# Patient Record
Sex: MALE | Race: WHITE | NOT HISPANIC OR LATINO | Employment: FULL TIME | URBAN - METROPOLITAN AREA
[De-identification: names, ages, dates, MRNs, and addresses within clinical notes are randomized per-mention and may not be internally consistent; named-entity substitution may affect disease eponyms.]

---

## 2018-05-22 ENCOUNTER — OFFICE VISIT (OUTPATIENT)
Dept: URGENT CARE | Facility: CLINIC | Age: 23
End: 2018-05-22
Payer: COMMERCIAL

## 2018-05-22 VITALS
BODY MASS INDEX: 18.66 KG/M2 | SYSTOLIC BLOOD PRESSURE: 124 MMHG | HEIGHT: 69 IN | RESPIRATION RATE: 17 BRPM | HEART RATE: 107 BPM | OXYGEN SATURATION: 97 % | WEIGHT: 126 LBS | DIASTOLIC BLOOD PRESSURE: 76 MMHG | TEMPERATURE: 99 F

## 2018-05-22 DIAGNOSIS — S01.01XA SCALP LACERATION, INITIAL ENCOUNTER: Primary | ICD-10-CM

## 2018-05-22 PROCEDURE — 90715 TDAP VACCINE 7 YRS/> IM: CPT | Mod: AT,S$GLB,, | Performed by: FAMILY MEDICINE

## 2018-05-22 PROCEDURE — 90471 IMMUNIZATION ADMIN: CPT | Mod: AT,S$GLB,, | Performed by: FAMILY MEDICINE

## 2018-05-22 PROCEDURE — 99999 PR PBB SHADOW E&M-NEW PATIENT-LVL III: CPT | Mod: PBBFAC,,, | Performed by: NURSE PRACTITIONER

## 2018-05-22 PROCEDURE — 3008F BODY MASS INDEX DOCD: CPT | Mod: CPTII,S$GLB,, | Performed by: NURSE PRACTITIONER

## 2018-05-22 PROCEDURE — 99214 OFFICE O/P EST MOD 30 MIN: CPT | Mod: 25,S$GLB,, | Performed by: NURSE PRACTITIONER

## 2018-05-22 RX ORDER — MUPIROCIN 20 MG/G
OINTMENT TOPICAL 3 TIMES DAILY
Qty: 22 G | Refills: 0 | Status: SHIPPED | OUTPATIENT
Start: 2018-05-22

## 2018-05-22 NOTE — PATIENT INSTRUCTIONS
Infected Laceration, Not Sutured  A laceration is a cut through the skin. The cut has become infected. Because of the infection, and the amount of time that has passed since injury, the wound cannot be closed. It will heal best if left open and cleaned daily. It will seal over by growing new tissue from the sides and the bottom of the wound. Antibiotics may be prescribed. A scar will probably remain after it has healed.   Oral antibiotic medicine may be prescribed to treat the infection.  Home care  · If antibiotics have been prescribed, take them exactly as directed. Do not stop taking them until they are gone or you are told to stop, even if you feel better.   · Follow the health care providers instructions on how to care for the cut.  · Unless otherwise instructed, change the bandage twice a day for the first few days, until the drainage stops. Then change it once a day. Change the bandage if it becomes wet, stained with wound fluid, or dirty.  · Clean the wound daily:  · After removing the bandage, gently wash the area with soap and water. Use a wet cotton swab to loosen and remove any blood or crust that forms.  · After cleaning, apply a thin layer of over-the-counter antibiotic ointment if advised. Reapply a fresh bandage.  · Follow the healthcare provider's instructions for keeping the wound dry. You may be given restrictions on showering or tub baths.  · If the bandage gets wet, remove it. Gently pat the wound dry with a clean cloth, then replace the wet bandage with a dry one.  · Do not scratch, rub, or pick at the area.  · Wash your hands with soap and warm water before and after cleaning the wound or changing the bandage.  Follow-up care  Follow up with your healthcare provider as advised. It is important to follow up to ensure that the infection is improving.  When to seek medical advice  Call your healthcare provider right away if any of these occur:  · Symptoms don't begin to improve or get  worse  · Red streaks coming from the wound  · Increase in pus coming from the wound  · Fever of 100.4°F (38°C) or higher, or as directed by your healthcare provider  Date Last Reviewed: 6/14/2015  © 1035-8702 [employer]. [address] [place] [id number]. All rights reserved. This information is not intended as a substitute for professional medical care. Always follow your healthcare professional's instructions.

## 2018-05-22 NOTE — PROGRESS NOTES
Subjective:       Patient ID: Jw Louis is a 23 y.o. male.    Chief Complaint: Head Injury    23 year old male presents to Urgent Care with reports of hitting his head on a nail while in an attic working. Denies any dizziness or headaches. Last tetanus greater than 10 years.       Head Injury    The incident occurred 6 to 12 hours ago. There was no loss of consciousness. The quality of the pain is described as aching. The pain is at a severity of 0/10. The patient is experiencing no pain. Pertinent negatives include no blurred vision, memory loss, numbness, vomiting or weakness. He has tried nothing for the symptoms.     Review of Systems   Constitutional: Negative for appetite change, chills and fever.   HENT: Negative for ear pain, sinus pressure, sore throat and trouble swallowing.    Eyes: Negative for blurred vision and visual disturbance.   Respiratory: Negative for shortness of breath.    Cardiovascular: Negative for chest pain.   Gastrointestinal: Negative for abdominal pain, diarrhea, nausea and vomiting.   Endocrine: Negative for cold intolerance, polyphagia and polyuria.   Genitourinary: Negative for decreased urine volume and dysuria.   Musculoskeletal: Negative for back pain.   Skin: Negative for rash.   Allergic/Immunologic: Negative for environmental allergies and food allergies.   Neurological: Negative for dizziness, tremors, weakness and numbness.   Hematological: Does not bruise/bleed easily.   Psychiatric/Behavioral: Negative for confusion, hallucinations and memory loss. The patient is not nervous/anxious and is not hyperactive.    All other systems reviewed and are negative.      Objective:     Physical Exam   Constitutional: He is oriented to person, place, and time. He appears well-developed and well-nourished.   HENT:   Head: Normocephalic.       Right Ear: External ear normal.   Left Ear: External ear normal.   Nose: Nose normal.   Mouth/Throat: Oropharynx is clear and moist.    Eyes: Conjunctivae and EOM are normal. Pupils are equal, round, and reactive to light.   Neck: Normal range of motion. Neck supple. No JVD present.   Cardiovascular: Normal rate, regular rhythm, normal heart sounds and intact distal pulses.    Pulmonary/Chest: Effort normal and breath sounds normal. He has no wheezes.   Abdominal: Soft. Bowel sounds are normal. There is no tenderness.   Lymphadenopathy:     He has no cervical adenopathy.   Neurological: He is alert and oriented to person, place, and time. He has normal strength. He displays a negative Romberg sign. Coordination and gait normal. GCS eye subscore is 4. GCS verbal subscore is 5. GCS motor subscore is 6.   Reflex Scores:       Tricep reflexes are 2+ on the right side and 2+ on the left side.       Bicep reflexes are 2+ on the right side and 2+ on the left side.       Brachioradialis reflexes are 2+ on the right side and 2+ on the left side.       Patellar reflexes are 2+ on the right side and 2+ on the left side.       Achilles reflexes are 2+ on the right side and 2+ on the left side.  Skin: Skin is warm and dry.   Psychiatric: He has a normal mood and affect. His behavior is normal. Judgment and thought content normal.   Nursing note and vitals reviewed.    Assessment:     1. Scalp laceration, initial encounter      Plan:   Jw Sanford was seen today for head injury.    Diagnoses and all orders for this visit:    Scalp laceration, initial encounter    Other orders  -     Tdap Vaccine  -     mupirocin (BACTROBAN) 2 % ointment; Apply topically 3 (three) times daily.    Instructed patient to follow prescribed treatment plan as directed and recommended follow up with PCP within 1 week or sooner if needed.

## 2018-05-22 NOTE — PROGRESS NOTES
Pt given TDAP vaccine to right deltoid. Pt tolerated injection well. Advised pt to stay 15 minutes after injection. Pt verbalized understanding.

## 2024-01-30 ENCOUNTER — IMMUNIZATION (OUTPATIENT)
Dept: PEDIATRICS | Facility: CLINIC | Age: 29
End: 2024-01-30
Payer: COMMERCIAL

## 2024-01-30 VITALS — TEMPERATURE: 98 F

## 2024-01-30 PROCEDURE — 90686 IIV4 VACC NO PRSV 0.5 ML IM: CPT | Mod: S$GLB,,, | Performed by: PEDIATRICS

## 2024-01-30 PROCEDURE — 90471 IMMUNIZATION ADMIN: CPT | Mod: S$GLB,,, | Performed by: PEDIATRICS

## 2024-07-09 ENCOUNTER — PATIENT MESSAGE (OUTPATIENT)
Dept: RESEARCH | Facility: HOSPITAL | Age: 29
End: 2024-07-09